# Patient Record
Sex: FEMALE | Race: OTHER | NOT HISPANIC OR LATINO | Employment: FULL TIME | URBAN - METROPOLITAN AREA
[De-identification: names, ages, dates, MRNs, and addresses within clinical notes are randomized per-mention and may not be internally consistent; named-entity substitution may affect disease eponyms.]

---

## 2018-04-20 ENCOUNTER — HOSPITAL ENCOUNTER (EMERGENCY)
Facility: HOSPITAL | Age: 33
Discharge: HOME | End: 2018-04-20
Attending: EMERGENCY MEDICINE
Payer: COMMERCIAL

## 2018-04-20 VITALS
TEMPERATURE: 99.2 F | RESPIRATION RATE: 18 BRPM | HEIGHT: 66 IN | BODY MASS INDEX: 24.11 KG/M2 | HEART RATE: 89 BPM | OXYGEN SATURATION: 100 % | SYSTOLIC BLOOD PRESSURE: 98 MMHG | WEIGHT: 150 LBS | DIASTOLIC BLOOD PRESSURE: 57 MMHG

## 2018-04-20 DIAGNOSIS — A08.4 VIRAL GASTROENTERITIS: Primary | ICD-10-CM

## 2018-04-20 LAB
ALBUMIN SERPL-MCNC: 4.3 G/DL (ref 3.4–5)
ALP SERPL-CCNC: 62 IU/L (ref 35–126)
ALT SERPL-CCNC: 16 IU/L (ref 11–54)
ANION GAP SERPL CALC-SCNC: 9 MEQ/L (ref 3–15)
AST SERPL-CCNC: 22 IU/L (ref 15–41)
BASOPHILS # BLD: 0.09 K/UL (ref 0.01–0.1)
BASOPHILS NFR BLD: 0.9 %
BILIRUB SERPL-MCNC: 0.6 MG/DL (ref 0.3–1.2)
BUN SERPL-MCNC: 12 MG/DL (ref 8–20)
CALCIUM SERPL-MCNC: 8.9 MG/DL (ref 8.9–10.3)
CHLORIDE SERPL-SCNC: 101 MMOL/L (ref 98–109)
CK MB SERPL-MCNC: 1.6 NG/ML (ref 0.5–4.7)
CK SERPL-CCNC: 199 IU/L (ref 15–200)
CO2 SERPL-SCNC: 23 MMOL/L (ref 22–32)
CREAT SERPL-MCNC: 0.7 MG/DL (ref 0.6–1.1)
DIFFERENTIAL METHOD BLD: ABNORMAL
EOSINOPHIL # BLD: 0.16 K/UL (ref 0.04–0.36)
EOSINOPHIL NFR BLD: 1.6 %
ERYTHROCYTE [DISTWIDTH] IN BLOOD BY AUTOMATED COUNT: 11.9 % (ref 11.7–14.4)
GFR SERPL CREATININE-BSD FRML MDRD: >60 ML/MIN/1.73M*2
GLUCOSE SERPL-MCNC: 133 MG/DL (ref 70–99)
HCG UR QL: NEGATIVE
HCT VFR BLDCO AUTO: 37.4 % (ref 35–45)
HGB BLD-MCNC: 12.6 G/DL (ref 11.8–15.7)
IMM GRANULOCYTES # BLD AUTO: 0.02 K/UL (ref 0–0.08)
IMM GRANULOCYTES NFR BLD AUTO: 0.2 %
LIPASE SERPL-CCNC: 20 U/L (ref 20–51)
LYMPHOCYTES # BLD: 1.77 K/UL (ref 1.2–3.5)
LYMPHOCYTES NFR BLD: 17.4 %
MCH RBC QN AUTO: 31.3 PG (ref 28–33.2)
MCHC RBC AUTO-ENTMCNC: 33.7 G/DL (ref 32.2–35.5)
MCV RBC AUTO: 92.8 FL (ref 83–98)
MONOCYTES # BLD: 0.47 K/UL (ref 0.28–0.8)
MONOCYTES NFR BLD: 4.6 %
NEUTROPHILS # BLD: 7.66 K/UL (ref 1.7–7)
NEUTS SEG NFR BLD: 75.3 %
NRBC BLD-RTO: 0 %
PDW BLD AUTO: 9.5 FL (ref 9.4–12.3)
PLATELET # BLD AUTO: 228 K/UL (ref 150–369)
POTASSIUM SERPL-SCNC: 3.4 MMOL/L (ref 3.6–5.1)
PROT SERPL-MCNC: 7.1 G/DL (ref 6–8.2)
RBC # BLD AUTO: 4.03 M/UL (ref 3.93–5.22)
SODIUM SERPL-SCNC: 133 MMOL/L (ref 136–144)
WBC # BLD AUTO: 10.17 K/UL (ref 3.8–10.5)

## 2018-04-20 PROCEDURE — 96375 TX/PRO/DX INJ NEW DRUG ADDON: CPT

## 2018-04-20 PROCEDURE — 96361 HYDRATE IV INFUSION ADD-ON: CPT

## 2018-04-20 PROCEDURE — 25800000 HC PHARMACY IV SOLUTIONS: Performed by: PHYSICIAN ASSISTANT

## 2018-04-20 PROCEDURE — 82550 ASSAY OF CK (CPK): CPT | Performed by: PHYSICIAN ASSISTANT

## 2018-04-20 PROCEDURE — 96374 THER/PROPH/DIAG INJ IV PUSH: CPT

## 2018-04-20 PROCEDURE — 63600000 HC DRUGS/DETAIL CODE: Performed by: PHYSICIAN ASSISTANT

## 2018-04-20 PROCEDURE — 82553 CREATINE MB FRACTION: CPT | Performed by: PHYSICIAN ASSISTANT

## 2018-04-20 PROCEDURE — 84132 ASSAY OF SERUM POTASSIUM: CPT | Performed by: EMERGENCY MEDICINE

## 2018-04-20 PROCEDURE — 3E033GC INTRODUCTION OF OTHER THERAPEUTIC SUBSTANCE INTO PERIPHERAL VEIN, PERCUTANEOUS APPROACH: ICD-10-PCS | Performed by: EMERGENCY MEDICINE

## 2018-04-20 PROCEDURE — S0028 INJECTION, FAMOTIDINE, 20 MG: HCPCS | Performed by: PHYSICIAN ASSISTANT

## 2018-04-20 PROCEDURE — 83690 ASSAY OF LIPASE: CPT | Performed by: EMERGENCY MEDICINE

## 2018-04-20 PROCEDURE — 99284 EMERGENCY DEPT VISIT MOD MDM: CPT | Mod: 25

## 2018-04-20 PROCEDURE — 85025 COMPLETE CBC W/AUTO DIFF WBC: CPT | Performed by: EMERGENCY MEDICINE

## 2018-04-20 PROCEDURE — 84703 CHORIONIC GONADOTROPIN ASSAY: CPT | Performed by: EMERGENCY MEDICINE

## 2018-04-20 PROCEDURE — 93005 ELECTROCARDIOGRAM TRACING: CPT | Performed by: EMERGENCY MEDICINE

## 2018-04-20 PROCEDURE — 36415 COLL VENOUS BLD VENIPUNCTURE: CPT | Performed by: EMERGENCY MEDICINE

## 2018-04-20 PROCEDURE — 25000000 HC PHARMACY GENERAL: Performed by: PHYSICIAN ASSISTANT

## 2018-04-20 RX ORDER — FAMOTIDINE 10 MG/ML
20 INJECTION INTRAVENOUS ONCE
Status: COMPLETED | OUTPATIENT
Start: 2018-04-20 | End: 2018-04-20

## 2018-04-20 RX ORDER — ONDANSETRON 4 MG/1
4 TABLET, ORALLY DISINTEGRATING ORAL EVERY 8 HOURS PRN
Qty: 10 TABLET | Refills: 0 | Status: SHIPPED | OUTPATIENT
Start: 2018-04-20

## 2018-04-20 RX ORDER — ONDANSETRON HYDROCHLORIDE 2 MG/ML
4 INJECTION, SOLUTION INTRAVENOUS ONCE
Status: COMPLETED | OUTPATIENT
Start: 2018-04-20 | End: 2018-04-20

## 2018-04-20 RX ADMIN — FAMOTIDINE 20 MG: 10 INJECTION INTRAVENOUS at 21:13

## 2018-04-20 RX ADMIN — ONDANSETRON 4 MG: 2 INJECTION INTRAMUSCULAR; INTRAVENOUS at 21:13

## 2018-04-20 RX ADMIN — SODIUM CHLORIDE 1000 ML: 900 INJECTION, SOLUTION INTRAVENOUS at 22:00

## 2018-04-20 RX ADMIN — SODIUM CHLORIDE 1000 ML: 9 INJECTION, SOLUTION INTRAVENOUS at 21:13

## 2018-04-20 ASSESSMENT — ENCOUNTER SYMPTOMS
FREQUENCY: 0
WEAKNESS: 1
CHILLS: 1
CHEST TIGHTNESS: 0
HEADACHES: 1
SHORTNESS OF BREATH: 0
DIFFICULTY URINATING: 0
PSYCHIATRIC NEGATIVE: 1
DIARRHEA: 0
FEVER: 0
DYSURIA: 0
ABDOMINAL PAIN: 0
BACK PAIN: 0
HEMATOLOGIC/LYMPHATIC NEGATIVE: 1
VOMITING: 1
NAUSEA: 1

## 2018-04-21 LAB
ATRIAL RATE: 93
P AXIS: 69
PR INTERVAL: 148
QRS DURATION: 78
QT INTERVAL: 360
QTC CALCULATION(BAZETT): 447
R AXIS: 54
RAINBOW HOLD SPECIMEN: NORMAL
T WAVE AXIS: 47
VENTRICULAR RATE: 93

## 2018-04-21 NOTE — DISCHARGE INSTRUCTIONS
Please drink plenty of fluids and rest as needed. Follow a clear liquid diet and advance to a bland diet as tolerated.   Take Zofran as needed for nausea.    Abstain from strenuous activity until symptoms improve.    Return to the ED if symptoms worsen or you develop any NEW symptoms.

## 2018-04-21 NOTE — ED PROVIDER NOTES
HPI     Chief Complaint   Patient presents with   • Nausea   • Vomiting   • Weakness - Generalized     33 y/o female presents with nausea, vomiting, weakness onset today. Pt states symptoms began as headache after intense workout class. Headache currently resolved. Pt. Stopped at the grocery store after the class as she thought symptoms were due to hypoglycemia. Pt. Notes she felt the urge to have a bowel movement and had several episodes of explosive diarrhea and vomiting. Pt. Attempted to lie down in her car after vomiting and diarrhea and was unable to stop shaking. Pt. Notes extreme weakness and fatigue.  Additionally pt states menstrual cycle began today - pt is currently nursing.       History provided by:  Patient   used: No    Vomiting   Associated symptoms: chills and headaches    Associated symptoms: no abdominal pain, no diarrhea and no fever    Risk factors: no alcohol use and not pregnant    Weakness - Generalized   Associated symptoms: headaches, nausea and vomiting    Associated symptoms: no abdominal pain, no chest pain, no diarrhea, no dysuria, no fever, no frequency and no shortness of breath         Patient History     History reviewed. No pertinent past medical history.    History reviewed. No pertinent surgical history.    History reviewed. No pertinent family history.    Social History   Substance Use Topics   • Smoking status: Never Smoker   • Smokeless tobacco: Never Used   • Alcohol use No       Systems Reviewed from Nursing Triage:          Review of Systems     Review of Systems   Constitutional: Positive for chills. Negative for fever.   HENT: Negative.    Respiratory: Negative for chest tightness and shortness of breath.    Cardiovascular: Negative for chest pain.   Gastrointestinal: Positive for nausea and vomiting. Negative for abdominal pain and diarrhea.   Endocrine: Negative for polyuria.   Genitourinary: Negative for difficulty urinating, dysuria, frequency and  menstrual problem (menstrual cycle began today).   Musculoskeletal: Negative for back pain.   Skin: Negative for rash.   Neurological: Positive for weakness and headaches.   Hematological: Negative.    Psychiatric/Behavioral: Negative.         Physical Exam     ED Triage Vitals [04/20/18 2030]   Temp Heart Rate Resp BP SpO2   36.8 °C (98.2 °F) 93 18 134/74 100 %      Temp Source Heart Rate Source Patient Position BP Location FiO2 (%) (Set)   Temporal Monitor Sitting Right upper arm --                     No data found.          Physical Exam   Constitutional: She appears well-developed.   HENT:   Head: Normocephalic and atraumatic.   Eyes: Conjunctivae are normal.   Neck: Normal range of motion.   Cardiovascular: Normal rate, regular rhythm and intact distal pulses.    Pulmonary/Chest: Effort normal and breath sounds normal.   Abdominal: Soft. She exhibits no distension and no mass. There is no tenderness.   Musculoskeletal: Normal range of motion. She exhibits no tenderness or deformity.   Neurological: She is alert. No cranial nerve deficit.   Skin: Skin is warm and dry.   Psychiatric: She has a normal mood and affect. Her behavior is normal.   Nursing note and vitals reviewed.           Procedures    ED Course & MDM     Labs Reviewed   COMPREHENSIVE METABOLIC PANEL - Abnormal        Result Value    Sodium 133 (*)     Potassium 3.4 (*)     Glucose 133 (*)     Chloride 101      CO2 23      BUN 12      Creatinine 0.7      Calcium 8.9      AST (SGOT) 22      ALT (SGPT) 16      Alkaline Phosphatase 62      Total Protein 7.1      Albumin 4.3      Bilirubin, Total 0.6      eGFR >60.0      Anion Gap 9     DIFF COUNT - Abnormal     Neutrophils, Absolute 7.66 (*)     Differential Type Auto      nRBC 0.0      Immature Granulocytes 0.2      Neutrophils 75.3      Lymphocytes 17.4      Monocytes 4.6      Eosinophils 1.6      Basophils 0.9      Immature Granulocytes, Absolute 0.02      Lymphocytes, Absolute 1.77      Monocytes,  Absolute 0.47      Eosinophils, Absolute 0.16      Basophils, Absolute 0.09     LIPASE - Normal    Lipase 20     BHCG, SERUM, QUAL - Normal    Preg Test, Serum Negative     CBC - Normal    WBC 10.17      RBC 4.03      Hemoglobin 12.6      Hematocrit 37.4      MCV 92.8      MCH 31.3      MCHC 33.7      RDW 11.9      Platelets 228      MPV 9.5     CK, TOTAL (PERF) - Normal    Total      CKMB (PERF) - Normal    CK-MB 1.6     CBC AND DIFFERENTIAL    Narrative:     The following orders were created for panel order CBC and differential.  Procedure                               Abnormality         Status                     ---------                               -----------         ------                     CBC[[id number]]                           Normal              Final result               Diff Count[[id number]]                    Abnormal            Final result                 Please view results for these tests on the individual orders.   RAINBOW DRAW PANEL    Narrative:     The following orders were created for panel order [place] Draw Panel.  Procedure                               Abnormality         Status                     ---------                               -----------         ------                     RAINBOW RED[[id number]]                                       Final result               RAINBOW LT BLUE[[id number]]                                   Final result               RAINBOW LT GREEN[[id number]]                                  Final result                 Please view results for these tests on the individual orders.   RAINBOW RED    [place] Tube RAINBOW HOLD SPECIMEN     RAINBOW LT BLUE    [place] Tube RAINBOW HOLD SPECIMEN     RAINBOW LT GREEN    [place] Tube RAINBOW HOLD SPECIMEN         ECG 12 lead   Final Result              [facility]         ED Course as of Apr 22 2354 Fri Apr 20, 2018 2059 I: post exertion NV  P: labs, zofran, fluids  [KM]      ED Course User Index  [KM] Carissa Pinto          Clinical Impressions as of Apr 22 2354   Viral gastroenteritis     Disposition:  Discharge   By signing my name below, I, Carissa Pinto, attest that this documentation has been prepared under the direction and in the presence of ALFRED Burgos PA-C.    4/20/2018 8:56 PM    I, Elena Burgos, personally performed the services described in this documentation, as documented by the scribe in my presence, and it is both accurate and complete.  4/22/2018 11:54 PM       Carissa Pinto  04/20/18 2100       MAYCOL Redd  04/22/18 6765

## 2018-06-02 NOTE — ED ATTESTATION NOTE
The patient was evaluated and managed by the physician assistant / nurse practitioner. I agree.      Vasyl Cook MD  06/02/18 0719

## 2021-04-15 DIAGNOSIS — Z23 ENCOUNTER FOR IMMUNIZATION: ICD-10-CM

## 2021-05-07 ENCOUNTER — OFFICE VISIT (OUTPATIENT)
Dept: SURGERY | Facility: CLINIC | Age: 36
End: 2021-05-07
Payer: COMMERCIAL

## 2021-05-07 VITALS
WEIGHT: 142 LBS | HEART RATE: 70 BPM | RESPIRATION RATE: 16 BRPM | OXYGEN SATURATION: 99 % | BODY MASS INDEX: 22.82 KG/M2 | SYSTOLIC BLOOD PRESSURE: 120 MMHG | DIASTOLIC BLOOD PRESSURE: 72 MMHG | HEIGHT: 66 IN

## 2021-05-07 DIAGNOSIS — N60.19 FIBROCYSTIC BREAST CHANGES, UNSPECIFIED LATERALITY: Primary | ICD-10-CM

## 2021-05-07 DIAGNOSIS — N64.4 BREAST PAIN: ICD-10-CM

## 2021-05-07 PROCEDURE — 3008F BODY MASS INDEX DOCD: CPT | Performed by: SURGERY

## 2021-05-07 PROCEDURE — 99243 OFF/OP CNSLTJ NEW/EST LOW 30: CPT | Performed by: SURGERY

## 2021-05-07 NOTE — LETTER
May 14, 2021     Ricki Tapia MD  [address] [facility] [address]    Patient: Ting Pastrana  YOB: 1985  Date of Visit: 5/7/2021      Dear Dr. Tapia:    Thank you for referring Ting Pastrana to me for evaluation. Below are my notes for this consultation.    If you have questions, please do not hesitate to call me. I look forward to following your patient along with you.         Sincerely,        Naveed Wray DO        CC: No Recipients  Naveed Wray DO  5/14/2021  2:33 PM  Signed   Ting Pastrana is a 35 y.o. female who presents today for evaluation.     She is here for consultation at the request of Dr. Tapia.  Recently she has been having some pain as well as increased nodularity in both breasts which seems to wax and wane.  She has not had any imaging.    Past Medical History:  History reviewed. No pertinent past medical history.    OB History:   OB History   No obstetric history on file.       Surgical History: History reviewed. No pertinent surgical history.    Social History:   Social History     Social History Narrative   • Not on file       Family History: History reviewed. No pertinent family history.    Allergies: Alcohol and Erythromycin base    Home Medications:  •  ondansetron ODT      Review of Studies.  As above I personally reviewed her films and agree with the recommendation.     Review of Systems   Constitutional: Negative for appetite change, chills, fatigue, fever and unexpected weight change.   HENT: Negative for congestion, facial swelling, hearing loss, mouth sores, sneezing and sore throat.    Eyes: Negative for discharge.   Respiratory: Negative for cough, chest tightness and shortness of breath.    Cardiovascular: Negative for chest pain.   Gastrointestinal: Negative for abdominal pain, blood in stool, constipation, diarrhea, nausea and rectal pain.   Genitourinary: Negative for difficulty urinating, dyspareunia and vaginal bleeding.   Musculoskeletal:  Negative for back pain and joint swelling.   Allergic/Immunologic: Negative for environmental allergies and food allergies.   Neurological: Negative for dizziness, weakness, numbness and headaches.   Hematological: Does not bruise/bleed easily.   Psychiatric/Behavioral: Negative for behavioral problems, dysphoric mood and sleep disturbance. The patient is not nervous/anxious.    All other systems reviewed and are negative.        She is a well-developed, well-nourished woman in no apparent distress. On HEENT exam there is no cervical adenopathy. There is no scleral icterus. She is alert and oriented times three. She has appropriate mood and affect. Neck is normal with full range of motion. There are no thyroid masses. Lungs are clear to auscultation bilaterally. Heart is RRR without any murmurs. She has symmetric breasts. Her breast exam shows bilateral nodular tissue without any worrisome or dominant masses.  Currently there is no tenderness with palpation.  There is mild increased nodularity in both upper outer quadrants which is feels like dense breast tissue.  Otherwise there are no other dominant masses, nipple discharge, skin retractions, supraclavicular or axillary adenopathy bilaterally. Abdomen is soft and nontender without organomegaly. Bilateral lower extremities are without clubbing, cyanosis or edema. On musculoskeletal exam, she moves all extremities without any obvious abnormality; there is no scoliosis.     Impression:   Problem List Items Addressed This Visit        Nervous    Breast pain       Other    Fibrocystic breast changes - Primary          Plan: I explained to Ting I do not see anything worrisome on exam.  We discussed possibly getting baseline imaging including a mammogram and a bilateral ultrasound.  However at this point she would like to hold off which is not unreasonable given her age and reassuring exam.  She should plan to get a baseline mammogram at the age of 40 or earlier if  there is a change in her exam.  She will follow-up with me on a as needed basis but understands I can see her back if there is any change in her exam or symptoms.

## 2021-05-14 PROBLEM — N60.19 FIBROCYSTIC BREAST CHANGES: Status: ACTIVE | Noted: 2021-05-14

## 2021-05-14 PROBLEM — N64.4 BREAST PAIN: Status: ACTIVE | Noted: 2021-05-14

## 2021-05-14 ASSESSMENT — ENCOUNTER SYMPTOMS
DIFFICULTY URINATING: 0
FACIAL SWELLING: 0
SHORTNESS OF BREATH: 0
FEVER: 0
UNEXPECTED WEIGHT CHANGE: 0
BACK PAIN: 0
FATIGUE: 0
SORE THROAT: 0
CONSTIPATION: 0
SLEEP DISTURBANCE: 0
CHILLS: 0
EYE DISCHARGE: 0
WEAKNESS: 0
ABDOMINAL PAIN: 0
DIARRHEA: 0
BRUISES/BLEEDS EASILY: 0
NAUSEA: 0
CHEST TIGHTNESS: 0
DYSPHORIC MOOD: 0
NERVOUS/ANXIOUS: 0
DIZZINESS: 0
APPETITE CHANGE: 0
BLOOD IN STOOL: 0
RECTAL PAIN: 0
HEADACHES: 0
NUMBNESS: 0
JOINT SWELLING: 0
COUGH: 0

## 2021-05-14 NOTE — PROGRESS NOTES
Ting Pastrana is a 35 y.o. female who presents today for evaluation.     She is here for consultation at the request of Dr. Tapia.  Recently she has been having some pain as well as increased nodularity in both breasts which seems to wax and wane.  She has not had any imaging.    Past Medical History:  History reviewed. No pertinent past medical history.    OB History:   OB History   No obstetric history on file.       Surgical History: History reviewed. No pertinent surgical history.    Social History:   Social History     Social History Narrative   • Not on file       Family History: History reviewed. No pertinent family history.    Allergies: Alcohol and Erythromycin base    Home Medications:  •  ondansetron ODT      Review of Studies.  As above I personally reviewed her films and agree with the recommendation.     Review of Systems   Constitutional: Negative for appetite change, chills, fatigue, fever and unexpected weight change.   HENT: Negative for congestion, facial swelling, hearing loss, mouth sores, sneezing and sore throat.    Eyes: Negative for discharge.   Respiratory: Negative for cough, chest tightness and shortness of breath.    Cardiovascular: Negative for chest pain.   Gastrointestinal: Negative for abdominal pain, blood in stool, constipation, diarrhea, nausea and rectal pain.   Genitourinary: Negative for difficulty urinating, dyspareunia and vaginal bleeding.   Musculoskeletal: Negative for back pain and joint swelling.   Allergic/Immunologic: Negative for environmental allergies and food allergies.   Neurological: Negative for dizziness, weakness, numbness and headaches.   Hematological: Does not bruise/bleed easily.   Psychiatric/Behavioral: Negative for behavioral problems, dysphoric mood and sleep disturbance. The patient is not nervous/anxious.    All other systems reviewed and are negative.        She is a well-developed, well-nourished woman in no apparent distress. On HEENT exam  there is no cervical adenopathy. There is no scleral icterus. She is alert and oriented times three. She has appropriate mood and affect. Neck is normal with full range of motion. There are no thyroid masses. Lungs are clear to auscultation bilaterally. Heart is RRR without any murmurs. She has symmetric breasts. Her breast exam shows bilateral nodular tissue without any worrisome or dominant masses.  Currently there is no tenderness with palpation.  There is mild increased nodularity in both upper outer quadrants which is feels like dense breast tissue.  Otherwise there are no other dominant masses, nipple discharge, skin retractions, supraclavicular or axillary adenopathy bilaterally. Abdomen is soft and nontender without organomegaly. Bilateral lower extremities are without clubbing, cyanosis or edema. On musculoskeletal exam, she moves all extremities without any obvious abnormality; there is no scoliosis.     Impression:   Problem List Items Addressed This Visit        Nervous    Breast pain       Other    Fibrocystic breast changes - Primary          Plan: I explained to Ting I do not see anything worrisome on exam.  We discussed possibly getting baseline imaging including a mammogram and a bilateral ultrasound.  However at this point she would like to hold off which is not unreasonable given her age and reassuring exam.  She should plan to get a baseline mammogram at the age of 40 or earlier if there is a change in her exam.  She will follow-up with me on a as needed basis but understands I can see her back if there is any change in her exam or symptoms.

## 2022-06-21 ENCOUNTER — APPOINTMENT (RX ONLY)
Dept: URBAN - METROPOLITAN AREA CLINIC 28 | Facility: CLINIC | Age: 37
Setting detail: DERMATOLOGY
End: 2022-06-21

## 2022-06-21 DIAGNOSIS — L72.8 OTHER FOLLICULAR CYSTS OF THE SKIN AND SUBCUTANEOUS TISSUE: ICD-10-CM

## 2022-06-21 PROCEDURE — 11300 SHAVE SKIN LESION 0.5 CM/<: CPT

## 2022-06-21 PROCEDURE — ? SHAVE REMOVAL

## 2022-06-21 ASSESSMENT — LOCATION SIMPLE DESCRIPTION DERM: LOCATION SIMPLE: RIGHT FOREARM

## 2022-06-21 ASSESSMENT — LOCATION DETAILED DESCRIPTION DERM: LOCATION DETAILED: RIGHT PROXIMAL DORSAL FOREARM

## 2022-06-21 ASSESSMENT — LOCATION ZONE DERM: LOCATION ZONE: ARM

## 2022-06-21 NOTE — PROCEDURE: SHAVE REMOVAL
Medical Necessity Information: It is in your best interest to select a reason for this procedure from the list below. All of these items fulfill various CMS LCD requirements except the new and changing color options.
Medical Necessity Clause: This procedure was medically necessary because the lesion that was treated was:
Lab: -[id number]
Lab Facility: 0
Detail Level: Detailed
Was A Bandage Applied: Yes
Size Of Lesion In Cm (Required): 0.4
Biopsy Method: Dermablade
Anesthesia Type: 1% lidocaine with epinephrine
Hemostasis: Aluminum Chloride and Electrocautery
Wound Care: Petrolatum
Render Path Notes In Note?: No
Consent was obtained from the patient. The risks and benefits to therapy were discussed in detail. Specifically, the risks of infection, scarring, bleeding, prolonged wound healing, incomplete removal, allergy to anesthesia, nerve injury and recurrence were addressed. Prior to the procedure, the treatment site was clearly identified and confirmed by the patient. All components of Universal Protocol/PAUSE Rule completed.
Post-Care Instructions: I reviewed with the patient in detail post-care instructions. Patient is to keep the biopsy site dry overnight, and then apply bacitracin twice daily until healed. Patient may apply hydrogen peroxide soaks to remove any crusting.
Notification Instructions: Patient will be notified of pathology results. However, patient instructed to call the office if not contacted within 2 weeks.
Billing Type: Third-Party Bill

## 2022-07-05 ENCOUNTER — RX ONLY (OUTPATIENT)
Age: 37
Setting detail: RX ONLY
End: 2022-07-05

## 2022-07-05 RX ORDER — MUPIROCIN 20 MG/G
OINTMENT TOPICAL QDAY
Qty: 22 | Refills: 1 | Status: ERX | COMMUNITY
Start: 2022-07-05

## 2022-07-11 ENCOUNTER — APPOINTMENT (EMERGENCY)
Dept: RADIOLOGY | Facility: HOSPITAL | Age: 37
End: 2022-07-11
Payer: COMMERCIAL

## 2022-07-11 ENCOUNTER — HOSPITAL ENCOUNTER (EMERGENCY)
Facility: HOSPITAL | Age: 37
Discharge: HOME | End: 2022-07-11
Attending: EMERGENCY MEDICINE
Payer: COMMERCIAL

## 2022-07-11 VITALS
RESPIRATION RATE: 16 BRPM | HEIGHT: 66 IN | DIASTOLIC BLOOD PRESSURE: 71 MMHG | TEMPERATURE: 97.8 F | WEIGHT: 143 LBS | OXYGEN SATURATION: 100 % | HEART RATE: 54 BPM | SYSTOLIC BLOOD PRESSURE: 122 MMHG | BODY MASS INDEX: 22.98 KG/M2
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
## 2022-07-11 DIAGNOSIS — S16.1XXA CERVICAL STRAIN, ACUTE, INITIAL ENCOUNTER: ICD-10-CM

## 2022-07-11 PROCEDURE — 72040 X-RAY EXAM NECK SPINE 2-3 VW: CPT

## 2022-07-11 PROCEDURE — 99283 EMERGENCY DEPT VISIT LOW MDM: CPT | Mod: 25

## 2022-07-11 PROCEDURE — 63700000 HC SELF-ADMINISTRABLE DRUG: Performed by: PHYSICIAN ASSISTANT

## 2022-07-11 RX ORDER — LIDOCAINE 560 MG/1
1 PATCH PERCUTANEOUS; TOPICAL; TRANSDERMAL DAILY
Qty: 30 PATCH | Refills: 0 | Status: SHIPPED | OUTPATIENT
Start: 2022-07-11 | End: 2022-07-16

## 2022-07-11 RX ORDER — LIDOCAINE 560 MG/1
1 PATCH PERCUTANEOUS; TOPICAL; TRANSDERMAL ONCE
Status: DISCONTINUED | OUTPATIENT
Start: 2022-07-11 | End: 2022-07-11 | Stop reason: HOSPADM

## 2022-07-11 RX ORDER — NAPROXEN 500 MG/1
500 TABLET ORAL 2 TIMES DAILY WITH MEALS
Qty: 20 TABLET | Refills: 0 | Status: SHIPPED | OUTPATIENT
Start: 2022-07-11 | End: 2022-07-16

## 2022-07-11 RX ORDER — CYCLOBENZAPRINE HCL 10 MG
10 TABLET ORAL 2 TIMES DAILY PRN
Qty: 5 TABLET | Refills: 0 | Status: SHIPPED | OUTPATIENT
Start: 2022-07-11 | End: 2022-07-14

## 2022-07-11 RX ADMIN — LIDOCAINE 1 PATCH: 4 PATCH TOPICAL at 16:20

## 2022-07-11 ASSESSMENT — ENCOUNTER SYMPTOMS
COLOR CHANGE: 0
NAUSEA: 0
HEADACHES: 1
WEAKNESS: 0
NUMBNESS: 0
DIZZINESS: 0
SHORTNESS OF BREATH: 0
NECK PAIN: 1
ABDOMINAL PAIN: 0
VOMITING: 0
BACK PAIN: 0
LIGHT-HEADEDNESS: 0

## 2022-07-11 NOTE — Clinical Note
Ting Pastrana was seen and treated in our emergency department on 7/11/2022.  Ting Pastrana may return to work on 07/12/2022.       If you have any questions or concerns, please don't hesitate to call.      Edith Solitario PA C
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
## 2022-07-11 NOTE — Clinical Note
Ting Pastrana was seen and treated in our emergency department on 7/11/2022.  Ting Pastrana may return to work on 07/13/2022.       If you have any questions or concerns, please don't hesitate to call.      Edith Solitario PA C

## 2022-07-11 NOTE — DISCHARGE INSTRUCTIONS
You can place heat and/or ice over your neck as directed in 20-minute increments (whichever feels better).     You can take Flexeril as prescribed as needed help with symptoms.  Do not drive when taking.    You can take naproxen as prescribed as needed for pain every 12 hours.  Do not take any other NSAIDs when taking.    You can place lidocaine patches over the area where you are having pain as needed every 12 hours.    Please follow up as directed to obtain any final results and review your plan of care. CALL your primary doctor's office today to schedule a follow up appointment within the next 48 hours.       REVIEW AND DISCUSS ALL OF YOUR MEDICATIONS WITH YOUR PRIMARY CARE TEAM WITHIN THE NEXT 2 DAYS, even ones that you may have been on for a long time.      Radiology review of your studies (XRAYs, CT Scans, Ultrasounds, MRI scans) may still be pending. Preliminary results may be available today but final written reports may not be available until tomorrow. Important findings may be included in the final radiology review.       If instructed please CALL the Emergency Department at [phone] to obtain the final results within the next 24 hours. Review the final results of all of your tests with your primary care doctor within the next 2 days.      Cultures and uncommon labs may take 2 days or more before results are available. Please ask about all pending tests until the final results are known. You may not be notified of important test results unless you ask for these when you call or when you follow up.      **PLEASE RETURN TO THE EMERGENCY DEPARTMENT IF YOU DEVELOP ANY NEW OR WORSENING SYMPTOMS**

## 2022-07-11 NOTE — Clinical Note
Ting Pastrana was seen and treated in our emergency department on 7/11/2022.  Ting Pastrana may return to work on 07/14/2022.       If you have any questions or concerns, please don't hesitate to call.      Edith Solitario PA C

## 2022-07-12 NOTE — ED ATTESTATION NOTE
The patient was evaluated and managed by the physician assistant under my supervision.   I agree with the PA's assessment and plan.       Darrick Garduno MD  07/11/22 2613

## 2022-08-22 ENCOUNTER — APPOINTMENT (RX ONLY)
Dept: URBAN - METROPOLITAN AREA CLINIC 28 | Facility: CLINIC | Age: 37
Setting detail: DERMATOLOGY
End: 2022-08-22

## 2022-08-22 PROBLEM — D23.71 OTHER BENIGN NEOPLASM OF SKIN OF RIGHT LOWER LIMB, INCLUDING HIP: Status: ACTIVE | Noted: 2022-08-22

## 2022-08-22 PROCEDURE — ? SHAVE REMOVAL

## 2022-08-22 PROCEDURE — ? PHOTO-DOCUMENTATION

## 2022-08-22 PROCEDURE — 11300 SHAVE SKIN LESION 0.5 CM/<: CPT

## 2022-08-22 NOTE — PROCEDURE: SHAVE REMOVAL
Medical Necessity Information: It is in your best interest to select a reason for this procedure from the list below. All of these items fulfill various CMS LCD requirements except the new and changing color options.
Medical Necessity Clause: This procedure was medically necessary because the lesion that was treated was:
Lab: [id number]
Lab Facility: [id number]
Detail Level: Detailed
Was A Bandage Applied: Yes
Size Of Lesion In Cm (Required): 0.4
Biopsy Method: Dermablade
Anesthesia Type: 1% lidocaine with epinephrine
Hemostasis: Drysol
Wound Care: Petrolatum
Render Path Notes In Note?: No
Consent was obtained from the patient. The risks and benefits to therapy were discussed in detail. Specifically, the risks of infection, scarring, bleeding, prolonged wound healing, incomplete removal, allergy to anesthesia, nerve injury and recurrence were addressed. Prior to the procedure, the treatment site was clearly identified and confirmed by the patient. All components of Universal Protocol/PAUSE Rule completed.
Post-Care Instructions: I reviewed with the patient in detail post-care instructions. Patient is to keep the biopsy site dry overnight, and then apply bacitracin twice daily until healed. Patient may apply hydrogen peroxide soaks to remove any crusting.
Notification Instructions: Patient will be notified of pathology results. However, patient instructed to call the office if not contacted within 2 weeks.
Billing Type: Third-Party Bill

## 2024-03-26 ENCOUNTER — APPOINTMENT (RX ONLY)
Dept: URBAN - METROPOLITAN AREA CLINIC 28 | Facility: CLINIC | Age: 39
Setting detail: DERMATOLOGY
End: 2024-03-26

## 2024-03-26 DIAGNOSIS — L30.1 DYSHIDROSIS [POMPHOLYX]: ICD-10-CM | Status: INADEQUATELY CONTROLLED

## 2024-03-26 DIAGNOSIS — L73.8 OTHER SPECIFIED FOLLICULAR DISORDERS: ICD-10-CM

## 2024-03-26 DIAGNOSIS — D485 NEOPLASM OF UNCERTAIN BEHAVIOR OF SKIN: ICD-10-CM

## 2024-03-26 PROBLEM — D48.5 NEOPLASM OF UNCERTAIN BEHAVIOR OF SKIN: Status: ACTIVE | Noted: 2024-03-26

## 2024-03-26 PROCEDURE — 99214 OFFICE O/P EST MOD 30 MIN: CPT

## 2024-03-26 PROCEDURE — ? DEFER

## 2024-03-26 PROCEDURE — ? PRESCRIPTION

## 2024-03-26 PROCEDURE — ? PRESCRIPTION MEDICATION MANAGEMENT

## 2024-03-26 PROCEDURE — ? COUNSELING

## 2024-03-26 RX ORDER — CLOBETASOL PROPIONATE 0.5 MG/G
CREAM TOPICAL BID
Qty: 60 | Refills: 3 | Status: ERX | COMMUNITY
Start: 2024-03-26

## 2024-03-26 RX ADMIN — CLOBETASOL PROPIONATE: 0.5 CREAM TOPICAL at 00:00

## 2024-03-26 ASSESSMENT — LOCATION DETAILED DESCRIPTION DERM
LOCATION DETAILED: LEFT AXILLARY VAULT
LOCATION DETAILED: RIGHT THENAR EMINENCE
LOCATION DETAILED: LEFT RADIAL PALM
LOCATION DETAILED: LEFT FOREHEAD

## 2024-03-26 ASSESSMENT — LOCATION ZONE DERM
LOCATION ZONE: HAND
LOCATION ZONE: AXILLAE
LOCATION ZONE: FACE

## 2024-03-26 ASSESSMENT — SEVERITY ASSESSMENT 2020: SEVERITY 2020: MILD

## 2024-03-26 ASSESSMENT — LOCATION SIMPLE DESCRIPTION DERM
LOCATION SIMPLE: LEFT HAND
LOCATION SIMPLE: LEFT AXILLARY VAULT
LOCATION SIMPLE: RIGHT HAND
LOCATION SIMPLE: LEFT FOREHEAD

## 2024-03-26 ASSESSMENT — ITCH NUMERIC RATING SCALE: HOW SEVERE IS YOUR ITCHING?: 10

## 2024-03-26 NOTE — PROCEDURE: PRESCRIPTION MEDICATION MANAGEMENT
Initiate Treatment: clobetasol 0.05 % topical cream: Apply to AA on hands BID x2 weeks and then use PRN for flare
Detail Level: Zone
Render In Strict Bullet Format?: No

## 2024-03-26 NOTE — HPI: RASH
What Type Of Note Output Would You Prefer (Optional)?: Bullet Format
Is The Patient Presenting As Previously Scheduled?: Yes
How Severe Is Your Rash?: moderate
Is This A New Presentation, Or A Follow-Up?: Rash
Additional History: Patient has been controlling this with nutritional supplements, she has Triamcinolone and doesn’t like to use topical steroids.

## 2024-03-26 NOTE — PROCEDURE: MIPS QUALITY
Detail Level: Detailed
Quality 226: Preventive Care And Screening: Tobacco Use: Screening And Cessation Intervention: Tobacco Screening not Performed
Quality 130: Documentation Of Current Medications In The Medical Record: Current Medications with Name, Dosage, Frequency, or Route not Documented, Reason not Given
Quality 431: Preventive Care And Screening: Unhealthy Alcohol Use - Screening: Patient did not receive brief counseling if identified as an unhealthy alcohol user

## 2024-03-26 NOTE — PROCEDURE: DEFER
Size Of Lesion In Cm (Optional): 0
Detail Level: Zone
Introduction Text (Please End With A Colon): The following procedure was deferred:
Procedure To Be Performed At Next Visit: Other
Other Procedure: Ultrasound

## 2024-03-26 NOTE — HPI: PSORIASIS (PATIENT REPORTED)
Do You Have A Family History Of Psoriasis?: no
Where Is Your Psoriasis Located?: Hands
List Prescription Topical Steroids You Tried (Separate Each Name With A Comma):: Triamcinolone
Additional History: Patient states she does show improvement on Triamcinolone but doesn’t like to use it.

## 2025-03-27 ENCOUNTER — APPOINTMENT (OUTPATIENT)
Dept: URBAN - METROPOLITAN AREA CLINIC 23 | Facility: CLINIC | Age: 40
Setting detail: DERMATOLOGY
End: 2025-03-27

## 2025-03-27 DIAGNOSIS — L30.1 DYSHIDROSIS [POMPHOLYX]: ICD-10-CM | Status: INADEQUATELY CONTROLLED

## 2025-03-27 DIAGNOSIS — D18.0 HEMANGIOMA: ICD-10-CM

## 2025-03-27 DIAGNOSIS — D22 MELANOCYTIC NEVI: ICD-10-CM

## 2025-03-27 DIAGNOSIS — L81.4 OTHER MELANIN HYPERPIGMENTATION: ICD-10-CM

## 2025-03-27 PROBLEM — D22.5 MELANOCYTIC NEVI OF TRUNK: Status: ACTIVE | Noted: 2025-03-27

## 2025-03-27 PROBLEM — D18.01 HEMANGIOMA OF SKIN AND SUBCUTANEOUS TISSUE: Status: ACTIVE | Noted: 2025-03-27

## 2025-03-27 PROBLEM — D23.71 OTHER BENIGN NEOPLASM OF SKIN OF RIGHT LOWER LIMB, INCLUDING HIP: Status: ACTIVE | Noted: 2025-03-27

## 2025-03-27 PROBLEM — L30.9 DERMATITIS, UNSPECIFIED: Status: ACTIVE | Noted: 2025-03-27

## 2025-03-27 PROCEDURE — 99214 OFFICE O/P EST MOD 30 MIN: CPT

## 2025-03-27 PROCEDURE — ? FULL BODY SKIN EXAM

## 2025-03-27 PROCEDURE — ? COUNSELING

## 2025-03-27 PROCEDURE — ? PRESCRIPTION MEDICATION MANAGEMENT

## 2025-03-27 PROCEDURE — ? SUNSCREEN RECOMMENDATIONS

## 2025-03-27 ASSESSMENT — LOCATION ZONE DERM
LOCATION ZONE: TRUNK
LOCATION ZONE: HAND

## 2025-03-27 ASSESSMENT — LOCATION SIMPLE DESCRIPTION DERM
LOCATION SIMPLE: UPPER BACK
LOCATION SIMPLE: LEFT HAND
LOCATION SIMPLE: RIGHT HAND

## 2025-03-27 ASSESSMENT — LOCATION DETAILED DESCRIPTION DERM
LOCATION DETAILED: RIGHT THENAR EMINENCE
LOCATION DETAILED: LEFT RADIAL PALM
LOCATION DETAILED: SUPERIOR THORACIC SPINE

## 2025-03-27 NOTE — PROCEDURE: PRESCRIPTION MEDICATION MANAGEMENT
Detail Level: Zone
Continue Regimen: clobetasol 0.05 % topical cream: Apply to AA on hands BID x2 weeks and then use PRN for flare
Render In Strict Bullet Format?: No